# Patient Record
Sex: FEMALE | Race: BLACK OR AFRICAN AMERICAN | Employment: FULL TIME | URBAN - METROPOLITAN AREA
[De-identification: names, ages, dates, MRNs, and addresses within clinical notes are randomized per-mention and may not be internally consistent; named-entity substitution may affect disease eponyms.]

---

## 2023-09-06 ENCOUNTER — TRANSCRIBE ORDERS (OUTPATIENT)
Dept: SCHEDULING | Age: 33
End: 2023-09-06

## 2023-09-06 DIAGNOSIS — N64.3 GALACTORRHEA NOT ASSOCIATED WITH CHILDBIRTH: Primary | ICD-10-CM

## 2023-09-14 ENCOUNTER — HOSPITAL ENCOUNTER (OUTPATIENT)
Dept: MAMMOGRAPHY | Age: 33
End: 2023-09-14
Payer: COMMERCIAL

## 2023-09-14 ENCOUNTER — HOSPITAL ENCOUNTER (OUTPATIENT)
Dept: MAMMOGRAPHY | Age: 33
Discharge: HOME | End: 2023-09-14
Payer: COMMERCIAL

## 2023-09-14 DIAGNOSIS — N64.3 GALACTORRHEA NOT ASSOCIATED WITH CHILDBIRTH: ICD-10-CM

## 2023-09-14 PROCEDURE — G0279 TOMOSYNTHESIS, MAMMO: HCPCS

## 2023-09-14 PROCEDURE — 76642 ULTRASOUND BREAST LIMITED: CPT

## 2024-08-05 ENCOUNTER — PROCEDURE VISIT (OUTPATIENT)
Dept: OBGYN CLINIC | Age: 34
End: 2024-08-05
Payer: COMMERCIAL

## 2024-08-05 ENCOUNTER — ROUTINE PRENATAL (OUTPATIENT)
Dept: OBGYN CLINIC | Age: 34
End: 2024-08-05

## 2024-08-05 VITALS
BODY MASS INDEX: 49 KG/M2 | DIASTOLIC BLOOD PRESSURE: 82 MMHG | HEIGHT: 64 IN | WEIGHT: 287 LBS | SYSTOLIC BLOOD PRESSURE: 124 MMHG

## 2024-08-05 DIAGNOSIS — O10.011 ESSENTIAL HYPERTENSION AFFECTING PREGNANCY IN FIRST TRIMESTER: ICD-10-CM

## 2024-08-05 DIAGNOSIS — O34.219 PREVIOUS CESAREAN SECTION COMPLICATING PREGNANCY: ICD-10-CM

## 2024-08-05 DIAGNOSIS — O36.80X0 ENCOUNTER TO DETERMINE FETAL VIABILITY OF PREGNANCY, SINGLE OR UNSPECIFIED FETUS: Primary | ICD-10-CM

## 2024-08-05 DIAGNOSIS — M35.01 SJOGREN'S SYNDROME WITH KERATOCONJUNCTIVITIS SICCA (HCC): ICD-10-CM

## 2024-08-05 DIAGNOSIS — O99.211 OBESITY AFFECTING PREGNANCY IN FIRST TRIMESTER, UNSPECIFIED OBESITY TYPE: ICD-10-CM

## 2024-08-05 DIAGNOSIS — Z12.4 PAP SMEAR FOR CERVICAL CANCER SCREENING: Primary | ICD-10-CM

## 2024-08-05 DIAGNOSIS — O21.9 NAUSEA/VOMITING IN PREGNANCY: ICD-10-CM

## 2024-08-05 DIAGNOSIS — Z31.430 ENCOUNTER OF FEMALE FOR TESTING FOR GENETIC DISEASE CARRIER STATUS FOR PROCREATIVE MANAGEMENT: ICD-10-CM

## 2024-08-05 DIAGNOSIS — N89.8 VAGINAL DISCHARGE: ICD-10-CM

## 2024-08-05 DIAGNOSIS — Z34.81 MULTIGRAVIDA IN FIRST TRIMESTER: ICD-10-CM

## 2024-08-05 DIAGNOSIS — Z11.3 SCREEN FOR STD (SEXUALLY TRANSMITTED DISEASE): ICD-10-CM

## 2024-08-05 DIAGNOSIS — Z34.81 ENCOUNTER FOR SUPERVISION OF OTHER NORMAL PREGNANCY, FIRST TRIMESTER: ICD-10-CM

## 2024-08-05 DIAGNOSIS — E28.2 PCOS (POLYCYSTIC OVARIAN SYNDROME): ICD-10-CM

## 2024-08-05 PROBLEM — M35.00 SJOGREN'S DISEASE (HCC): Status: ACTIVE | Noted: 2024-08-05

## 2024-08-05 LAB
ALBUMIN SERPL-MCNC: 3.6 G/DL (ref 3.5–5)
ALBUMIN/GLOB SERPL: 1 (ref 1–1.9)
ALP SERPL-CCNC: 106 U/L (ref 35–104)
ALT SERPL-CCNC: 23 U/L (ref 12–65)
ANION GAP SERPL CALC-SCNC: 12 MMOL/L (ref 9–18)
AST SERPL-CCNC: 23 U/L (ref 15–37)
BILIRUB SERPL-MCNC: 0.6 MG/DL (ref 0–1.2)
BLOOD GROUP ANTIBODIES SERPL: NORMAL
BUN SERPL-MCNC: 6 MG/DL (ref 6–23)
CALCIUM SERPL-MCNC: 9.6 MG/DL (ref 8.8–10.2)
CHLORIDE SERPL-SCNC: 101 MMOL/L (ref 98–107)
CO2 SERPL-SCNC: 23 MMOL/L (ref 20–28)
CREAT SERPL-MCNC: 0.72 MG/DL (ref 0.6–1.1)
CREAT UR-MCNC: 326 MG/DL (ref 28–217)
ERYTHROCYTE [DISTWIDTH] IN BLOOD BY AUTOMATED COUNT: 17 % (ref 11.9–14.6)
EST. AVERAGE GLUCOSE BLD GHB EST-MCNC: 126 MG/DL
GLOBULIN SER CALC-MCNC: 3.7 G/DL (ref 2.3–3.5)
GLUCOSE SERPL-MCNC: 80 MG/DL (ref 70–99)
HBA1C MFR BLD: 6 % (ref 0–5.6)
HBV SURFACE AG SER QL: NONREACTIVE
HCT VFR BLD AUTO: 35.7 % (ref 35.8–46.3)
HCV AB SER QL: NONREACTIVE
HGB BLD-MCNC: 10.5 G/DL (ref 11.7–15.4)
HIV 1+2 AB+HIV1 P24 AG SERPL QL IA: NONREACTIVE
HIV 1/2 RESULT COMMENT: NORMAL
LDH SERPL L TO P-CCNC: 143 U/L (ref 127–281)
MCH RBC QN AUTO: 23.9 PG (ref 26.1–32.9)
MCHC RBC AUTO-ENTMCNC: 29.4 G/DL (ref 31.4–35)
MCV RBC AUTO: 81.1 FL (ref 82–102)
NRBC # BLD: 0 K/UL (ref 0–0.2)
PLATELET # BLD AUTO: 378 K/UL (ref 150–450)
PMV BLD AUTO: 10.2 FL (ref 9.4–12.3)
POTASSIUM SERPL-SCNC: 4.3 MMOL/L (ref 3.5–5.1)
PROT SERPL-MCNC: 7.3 G/DL (ref 6.3–8.2)
PROT UR-MCNC: 23 MG/DL
PROT/CREAT UR-RTO: 0.1
RBC # BLD AUTO: 4.4 M/UL (ref 4.05–5.2)
RUBV IGG SERPL IA-ACNC: >500 IU/ML
SODIUM SERPL-SCNC: 136 MMOL/L (ref 136–145)
T PALLIDUM AB SER QL IA: NONREACTIVE
URATE SERPL-MCNC: 4.2 MG/DL (ref 2.5–7.1)
WBC # BLD AUTO: 8.6 K/UL (ref 4.3–11.1)

## 2024-08-05 PROCEDURE — 76801 OB US < 14 WKS SINGLE FETUS: CPT | Performed by: OBSTETRICS & GYNECOLOGY

## 2024-08-05 PROCEDURE — 0500F INITIAL PRENATAL CARE VISIT: CPT | Performed by: NURSE PRACTITIONER

## 2024-08-05 RX ORDER — LABETALOL 100 MG/1
1 TABLET, FILM COATED ORAL 2 TIMES DAILY
COMMUNITY
End: 2024-08-05

## 2024-08-05 RX ORDER — ALBUTEROL SULFATE 90 UG/1
AEROSOL, METERED RESPIRATORY (INHALATION)
COMMUNITY
Start: 2024-05-01

## 2024-08-05 RX ORDER — ONDANSETRON 4 MG/1
TABLET, ORALLY DISINTEGRATING ORAL
COMMUNITY
End: 2024-08-05

## 2024-08-05 RX ORDER — ONDANSETRON 4 MG/1
4 TABLET, ORALLY DISINTEGRATING ORAL 3 TIMES DAILY PRN
Qty: 30 TABLET | Refills: 1 | Status: SHIPPED | OUTPATIENT
Start: 2024-08-05

## 2024-08-05 ASSESSMENT — PATIENT HEALTH QUESTIONNAIRE - PHQ9
1. LITTLE INTEREST OR PLEASURE IN DOING THINGS: NOT AT ALL
SUM OF ALL RESPONSES TO PHQ QUESTIONS 1-9: 0
SUM OF ALL RESPONSES TO PHQ QUESTIONS 1-9: 0
SUM OF ALL RESPONSES TO PHQ9 QUESTIONS 1 & 2: 0
2. FEELING DOWN, DEPRESSED OR HOPELESS: NOT AT ALL
SUM OF ALL RESPONSES TO PHQ QUESTIONS 1-9: 0
SUM OF ALL RESPONSES TO PHQ QUESTIONS 1-9: 0

## 2024-08-05 ASSESSMENT — ANXIETY QUESTIONNAIRES
IF YOU CHECKED OFF ANY PROBLEMS ON THIS QUESTIONNAIRE, HOW DIFFICULT HAVE THESE PROBLEMS MADE IT FOR YOU TO DO YOUR WORK, TAKE CARE OF THINGS AT HOME, OR GET ALONG WITH OTHER PEOPLE: NOT DIFFICULT AT ALL
2. NOT BEING ABLE TO STOP OR CONTROL WORRYING: SEVERAL DAYS
5. BEING SO RESTLESS THAT IT IS HARD TO SIT STILL: NOT AT ALL
1. FEELING NERVOUS, ANXIOUS, OR ON EDGE: SEVERAL DAYS
3. WORRYING TOO MUCH ABOUT DIFFERENT THINGS: NOT AT ALL
GAD7 TOTAL SCORE: 3
6. BECOMING EASILY ANNOYED OR IRRITABLE: NOT AT ALL
4. TROUBLE RELAXING: NOT AT ALL
7. FEELING AFRAID AS IF SOMETHING AWFUL MIGHT HAPPEN: SEVERAL DAYS

## 2024-08-05 NOTE — ASSESSMENT & PLAN NOTE
D/W pt at length that prev study that showed potential 0.25% increase in cardiac defect in patients that used Zofran prior to 12 weeks has since been disproven.  Also that cont N/V in pregnancy with weight loss and potential dehydration carries more risks than potential risks of Zofran.  Pt understood and accepts all know and unknown risks and wishes to proceed with treatment.

## 2024-08-05 NOTE — PROGRESS NOTES
I have reviewed the patient's visit today including history, exam and assessment by THOM Olivares.  I agree with treatment/plan as above.    Chris Ramsay MD  3:17 PM  08/05/24

## 2024-08-05 NOTE — PROGRESS NOTES
HPI    This is a 33 y.o.   at 9w0d for new OB visit.        Her Estimated Due Date is 3/10/2025, by Last Menstrual Period            Denies leaking of fluid, vaginal bleeding, or regular contractions.       Current Outpatient Medications on File Prior to Visit   Medication Sig Dispense Refill    albuterol sulfate HFA (PROAIR HFA) 108 (90 Base) MCG/ACT inhaler       Prenatal Vit-Fe Fumarate-FA (PRENATAL PO) Take by mouth       No current facility-administered medications on file prior to visit.       No Known Allergies        OB History    Para Term  AB Living   3 1 1 0 1 1   SAB IAB Ectopic Molar Multiple Live Births   1 0 0 0 0 1       # 1 - Date: 20, Sex: Male, Weight: None, GA: 39w1d, Delivery: , Unspecified, Apgar1: None, Apgar5: None, Living: Living, Birth Comments: None    # 2 - Date: , Sex: None, Weight: None, GA: None, Delivery: None, Apgar1: None, Apgar5: None, Living: None, Birth Comments: None    # 3 - Date: None, Sex: None, Weight: None, GA: None, Delivery: None, Apgar1: None, Apgar5: None, Living: None, Birth Comments: None          Past Medical History:   Diagnosis Date    Anemia     Anxiety     Asthma     Autoimmune disorder (HCC)     Fibromyalgia    Chronic pain of both knees     Galactorrhea not associated with childbirth     GERD (gastroesophageal reflux disease)     History of fibromyalgia     Hypertension     PCOS (polycystic ovarian syndrome)     Sjogren's syndrome with keratoconjunctivitis sicca (HCC)     Vitamin D deficiency        Past Surgical History:   Procedure Laterality Date     SECTION      CHOLECYSTECTOMY      COLPOSCOPY      LAPAROSCOPY         Family History   Problem Relation Age of Onset    Asthma Mother     Diabetes Mother     Hypertension Mother     Spont Abortions Mother     Breast Cancer Neg Hx     Colon Cancer Neg Hx     Uterine Cancer Neg Hx     Ovarian Cancer Neg Hx        Social History     Socioeconomic

## 2024-08-05 NOTE — PROGRESS NOTES
Chaperone for Intimate Exam     Chaperone was offer accepted as part of the rooming process    Chaperone: Rose Rocha

## 2024-08-05 NOTE — PROGRESS NOTES
Patient comes in today for initial prenatal visit.     Fetal Movements:  No  Contractions:  No  Vaginal Bleeding:  No  Leaking Fluid:  No  GI/ issues:  Yes-n/v, patient has tried multiple emilie products and sea bands educated patient on other products she can try.     Drug/Alcohol 4P's Plus Screening    1.  Have either of your parents ever had a problem with drugs/alcohol/prescription drugs? Yes  2.  Does your partner have a problem with drugs/alcohol/prescription drugs?  No  3.  In the past, have you ever had a problem with drugs/alcohol/prescription drugs?  No  4.  Before you were pregnant, in the past month, have you done any drugs, drank any alcohol or abused any prescription drugs?    No  If \"YES\" to any of the above, please give further details:  FOB states john father has hx of alcoholism     LAST PAP:  possibly 2020    LAST MAMMO:  9/14/2023- patient states it was normal.     LMP:  Patient's last menstrual period was 06/03/2024.    FAMILY HISTORY OF:   Breast Cancer:  No   Ovarian Cancer:  No   Uterine Cancer:  No   Colon Cancer:  No    Vitals:    08/05/24 1330   BP: 124/82   Site: Right Upper Arm   Position: Sitting   Weight: 130.2 kg (287 lb)   Height: 1.626 m (5' 4\")        RUI VELASQUEZ RN  08/05/24  1:38 PM

## 2024-08-05 NOTE — ASSESSMENT & PLAN NOTE
History reviewed  PNV's ordered (if not already taking)  PN labs, UDS ordered  Problem list reviewed  OB physical completed  OB prenatal packet/education reviewed: Information included discusses general pregnancy topics, fetal development, weight gain, nutrition, breastfeeding, risky behaviors, WIC, vaccinations and hospital information.    RTO for 24 hr urine drop off  Referral sent to [facility]  PTL/labor precautions, FMC, and pregnancy warning signs reviewed.      Genetic testing - D/W pt at length genetic testing that is recommended by ACOG -- NIPT, Quad screen (for trisomies and NTD), CF, SMA.  Brief discussion of these diseases - conditions that may increase risks, etiology, carrier states, fetal effects, treatment options, etc was undertaken. D/W pt that these are screening tests/carrier screening test only and are NOT mandatory. We also discuss false POS/false neg rates. It is her decision whether to have them done and how to proceed with the information afterwards.

## 2024-08-05 NOTE — ASSESSMENT & PLAN NOTE
Reviewed with patient 1% risk of uterine scar separation in labor.  Also  of serious problem to mother or baby such as hemorrhage, needing blood transfusion, possible hysterectomy, maternal death, damage to baby or death of baby.    Reviewed risks of repeat C/s as well.    Reviewed with patient that we do not induce previous c/s with medications.    She is still interested in .

## 2024-08-06 DIAGNOSIS — O99.810 PREDIABETES IN MOTHER DURING PREGNANCY: Primary | ICD-10-CM

## 2024-08-06 NOTE — TELEPHONE ENCOUNTER
So far, prenatal labs normal except A1C is in pre-diabetic range and she is anemic      Please call patient and let her know that her hemoglobin was low. She needs to start taking     FeSO4 325mg PO ONCE DAILY Disp: 30 RF:2  Colace 100mg PO BID Disp: 60 RF: 2 prn for constipation      Recommend taking iron on empty stomach or with foods rich in Vitamin C     Encourage foods that are high in iron (I.e. red meats, green leafy vegetables, peanut butter)    Also increase fluids and foods high in fiber to prevent constipation.         2. We recommend a diet high in vegetables, fruits, whole grains, low fat dairy, and lean meats such as baked chicken and fish. Limit the amount of red meat, sweets, and sugary beverages (such as soda and sweet tea). Exercise 30 minutes every day.  Review the safe exercises and healthy diet planning pages of the prenatal booklet that we discussed yesterday

## 2024-08-07 RX ORDER — DOCUSATE SODIUM 100 MG/1
100 CAPSULE, LIQUID FILLED ORAL 2 TIMES DAILY
Qty: 60 CAPSULE | Refills: 5 | Status: SHIPPED | OUTPATIENT
Start: 2024-08-07

## 2024-08-07 RX ORDER — FERROUS SULFATE 325(65) MG
325 TABLET ORAL
Qty: 30 TABLET | Refills: 5 | Status: SHIPPED | OUTPATIENT
Start: 2024-08-07

## 2024-08-07 NOTE — TELEPHONE ENCOUNTER
Patient informed of results and recommendations. Rx pend to be sent. Patient advised that if she does not hear from [facility] in a week to let us know. Patient voiced understanding. [email or web address]

## 2024-08-08 LAB
C TRACH RRNA CVX QL NAA+PROBE: NEGATIVE
COLLECTION METHOD: NORMAL
CYTOLOGIST CVX/VAG CYTO: NORMAL
CYTOLOGY CVX/VAG DOC THIN PREP: NORMAL
DATE OF LMP: NORMAL
HGB A2 MFR BLD COLUMN CHROM: 2.5 % (ref 1.8–3.2)
HGB F MFR BLD: 0 % (ref 0–2)
HGB FRACT BLD-IMP: NORMAL
HGB S MFR BLD: 0 %
HPV APTIMA: NEGATIVE
HPV GENOTYPE REFLEX: NORMAL
Lab: NORMAL
N GONORRHOEA RRNA CVX QL NAA+PROBE: NEGATIVE
PAP SOURCE: NORMAL
PATH REPORT.FINAL DX SPEC: NORMAL
STAT OF ADQ CVX/VAG CYTO-IMP: NORMAL
T VAGINALIS RRNA SPEC QL NAA+PROBE: NEGATIVE

## 2024-08-12 LAB
Lab: NORMAL
NTRA FETAL FRACTION: NORMAL
NTRA FETAL RHD SUMMARY: NORMAL
NTRA GENDER OF FETUS: NORMAL
NTRA MONOSOMY X AGE-BASED RISK TEXT: NORMAL
NTRA MONOSOMY X RESULT TEXT: NORMAL
NTRA MONOSOMY X RISK SCORE TEXT: NORMAL
NTRA TRIPLOIDY RESULT TEXT: NORMAL
NTRA TRISOMY 13 AGE-BASED RISK TEXT: NORMAL
NTRA TRISOMY 13 RESULT TEXT: NORMAL
NTRA TRISOMY 13 RISK SCORE TEXT: NORMAL
NTRA TRISOMY 18 AGE-BASED RISK TEXT: NORMAL
NTRA TRISOMY 18 RESULT TEXT: NORMAL
NTRA TRISOMY 18 RISK SCORE TEXT: NORMAL
NTRA TRISOMY 21 AGE-BASED RISK TEXT: NORMAL
NTRA TRISOMY 21 RESULT TEXT: NORMAL
NTRA TRISOMY 21 RISK SCORE TEXT: NORMAL

## 2024-08-15 ENCOUNTER — TELEPHONE (OUTPATIENT)
Dept: OBGYN CLINIC | Age: 34
End: 2024-08-15

## 2024-08-15 NOTE — TELEPHONE ENCOUNTER
Called patient back regarding the referral.   Patient confirms she spoke with [facility] [place] where the referral was sent. Referral was sent there due to Veda Porter MD practicing at this location.     Patient continues to state that when her information was entered in their system they did not see a referral.     Called [facility] at [phone] where referral was sent. Was on the phone for 20 minutes. Unable to LVM.    Will re-send referral there.   Will also send referral to [facility] in [facility] as that is another location where Veda Porter MD practices. Referral faxed on 8/15/2024. Messaged patient and updated her that referral was also sent to that location.

## 2024-08-15 NOTE — TELEPHONE ENCOUNTER
Called patient and updated her that referral was sent to [facility] and that their phone number for her to call tomorrow to check on referral was sent in [employer] message as well as to re-call the [facility] since referral was re-sent.     Patient verbalized understanding and states that the [facility] location had a first availability of 11/2024.   So patient will call the [facility] and get back to us with an update.

## 2024-08-15 NOTE — TELEPHONE ENCOUNTER
Pt lvm stating she was message [place] on "[employer]t regarding her referral to [facility]. Pt states it has been 1wk since Monday and she call the office and they stated they did not get the referral.

## 2024-08-18 LAB
Lab: NEGATIVE
Lab: NORMAL
NTRA CYSTIC FIBROSIS: NEGATIVE
NTRA DUCHENNE/BECKER MUSCULAR DYSTROPHY: NEGATIVE
NTRA FRAGILE X SYNDROME: NEGATIVE
NTRA SPINAL MUSCULAR ATROPHY: NEGATIVE

## 2024-08-21 ENCOUNTER — TELEPHONE (OUTPATIENT)
Dept: OBGYN CLINIC | Age: 34
End: 2024-08-21

## 2024-08-21 NOTE — TELEPHONE ENCOUNTER
Called and spoke to [facility] about getting pt scheduled. They stated they have the referral and the lady I spoke to sent a note to the nurses/providers while we were on the phone stating the pt needed to be scheduled ASAP. She stated she will call pt today.     Called pt, informed pt of message below and she voiced understanding.